# Patient Record
Sex: FEMALE | Race: OTHER | HISPANIC OR LATINO
[De-identification: names, ages, dates, MRNs, and addresses within clinical notes are randomized per-mention and may not be internally consistent; named-entity substitution may affect disease eponyms.]

---

## 2019-10-01 PROBLEM — Z00.00 ENCOUNTER FOR PREVENTIVE HEALTH EXAMINATION: Status: ACTIVE | Noted: 2019-10-01

## 2019-10-22 ENCOUNTER — APPOINTMENT (OUTPATIENT)
Dept: GASTROENTEROLOGY | Facility: CLINIC | Age: 49
End: 2019-10-22

## 2022-04-12 ENCOUNTER — APPOINTMENT (OUTPATIENT)
Dept: GASTROENTEROLOGY | Facility: CLINIC | Age: 52
End: 2022-04-12

## 2024-07-23 ENCOUNTER — EMERGENCY (EMERGENCY)
Facility: HOSPITAL | Age: 54
LOS: 1 days | Discharge: HOME | End: 2024-07-23
Attending: EMERGENCY MEDICINE
Payer: COMMERCIAL

## 2024-07-23 VITALS
TEMPERATURE: 102 F | SYSTOLIC BLOOD PRESSURE: 143 MMHG | RESPIRATION RATE: 22 BRPM | WEIGHT: 193.12 LBS | DIASTOLIC BLOOD PRESSURE: 78 MMHG | HEART RATE: 89 BPM | OXYGEN SATURATION: 99 %

## 2024-07-23 RX ORDER — DEXTROSE MONOHYDRATE AND SODIUM CHLORIDE 5; .3 G/100ML; G/100ML
2700 INJECTION, SOLUTION INTRAVENOUS ONCE
Refills: 0 | Status: COMPLETED | OUTPATIENT
Start: 2024-07-23 | End: 2024-07-23

## 2024-07-23 RX ORDER — FAMOTIDINE 40 MG
20 TABLET ORAL ONCE
Refills: 0 | Status: COMPLETED | OUTPATIENT
Start: 2024-07-23 | End: 2024-07-23

## 2024-07-23 RX ORDER — AMPICILLIN AND SULBACTAM 2; 1 G/20ML; G/20ML
3 INJECTION, POWDER, FOR SOLUTION INTRAMUSCULAR; INTRAVENOUS ONCE
Refills: 0 | Status: COMPLETED | OUTPATIENT
Start: 2024-07-23 | End: 2024-07-23

## 2024-07-23 RX ORDER — ACETAMINOPHEN 325 MG
1000 TABLET ORAL ONCE
Refills: 0 | Status: COMPLETED | OUTPATIENT
Start: 2024-07-23 | End: 2024-07-23

## 2024-07-23 RX ORDER — AMPICILLIN AND SULBACTAM 2; 1 G/20ML; G/20ML
3 INJECTION, POWDER, FOR SOLUTION INTRAMUSCULAR; INTRAVENOUS EVERY 6 HOURS
Refills: 0 | Status: DISCONTINUED | OUTPATIENT
Start: 2024-07-24 | End: 2024-07-31

## 2024-07-23 NOTE — ED PROVIDER NOTE - NSFOLLOWUPINSTRUCTIONS_ED_ALL_ED_FT
diverticulitis  Contorno del cuerpo que muestra el estómago y los intestinos, con un primer plano de los divertículos del intestino grueso.  La diverticulitis ocurre cuando las heces y las bacterias quedan atrapadas en pequeñas bolsas en el colon llamadas divertículos. Estas bolsas pueden formarse si usted tiene morales afección llamada diverticulosis. Cuando las heces y las bacterias quedan atrapadas, pueden provocar morales infección e inflamación.    La diverticulitis puede causar dolor de estómago intenso y diarrea. También puede provocar daño tisular en el colon. [place] puede causar sangrado u obstrucción. En algunos casos, los divertículos pueden estallar (romperse). [place] puede hacer que las heces infectadas lleguen a otras partes del abdomen.    ¿Cuales son las causas?  Esta afección es causada porque las heces quedan atrapadas en los divertículos. [place] permite que las bacterias crezcan. Puede provocar inflamación e infección.    ¿Qué aumenta el riesgo?  Es más probable que padezca esta afección si tiene diverticulosis. También corre mayor riesgo si:  Tiene sobrepeso o es kandi.  No haces suficiente ejercicio.  Tu bebes alcohol.  Tu fumas.  Comes mucha carne reji, randi ternera, cerdo o foley.  No obtienes suficiente fibra. Los alimentos ricos en fibra incluyen frutas, verduras, frijoles, nueces y cereales integrales.  Tienes más de 40 años.  ¿Cuáles son los signos o síntomas?  Los síntomas de esta afección pueden incluir:  Dolor y sensibilidad en el abdomen. Sana dolor a menudo se siente en el lado lex, jose luis puede ocurrir en otros lugares.  Fiebre y escalofríos.  Náuseas y vómitos.  [place].  Hinchazón.  Cambios en la frecuencia con la que defecas.  Gaudencio en tu saroj.  ¿Cómo se diagnostica esto?  Esta condición se diagnostica según herrera historial médico y un examen físico. También es posible que le realicen pruebas para asegurarse de que no haya nada más que esté causando herrera afección. Estas pruebas pueden incluir:  Análisis de gaudencio.  Pruebas realizadas en herrera orina (orina).  Morales tomografía computarizada del abdomen.  Es posible que necesite hacerse morales colonoscopia. Sana es un examen para observar todo el intestino grueso. Micah el examen, se coloca un tubo en la abertura del trasero (ano) y luego se pasa al recto, colon y otras partes del intestino grueso.  Sana examen se realiza para observar los divertículos. También puede jesus si hay algo más que pueda estar causando marina síntomas.    ¿Cómo se trata esto?  La mayoría de los casos son leves y pueden tratarse en casa. Es posible que le indiquen que:  Banks analgésicos de venta joanna.  Sólo coma y rafaela líquidos shaan.  [place] antibióticos.  Descansar.  Es posible que los casos más graves deban tratarse en un hospital. El tratamiento puede incluir:  No comer ni beber.  Nikhil analgésicos.  Recibir antibióticos por vía intravenosa.  Recibir líquidos y nutrición a través de morales vía intravenosa.  Cirugía.  Sigue estas instrucciones en casa:  Medicamentos    Banks los medicamentos recetados y de venta joanna únicamente según las indicaciones de herrera proveedor de atención médica. Estos incluyen suplementos de fibra, probióticos y medicamentos para ablandar las heces (ablandadores de heces).  Si le recetaron antibióticos, tómelos según las indicaciones de herrera proveedor. No deje de usar el antibiótico incluso si comienza a sentirse mejor.  Pregúntele a herrera proveedor si el medicamento que le recetaron requiere que evite conducir o utilizar maquinaria.  Comiendo y bebiendo    Marylou, omega rojos, alcachofa y frijoles.  Siga la dieta indicada por herrera proveedor. Es posible que sólo necesite comer y beber líquidos.  Morales vez que marina síntomas mejoren, es posible que pueda volver a morales dieta más normal. Es posible que le indiquen que coma al menos 25 gramos (25 g) de fibra al día. La fibra hace que sea más fácil defecar. Las byrd saludables de fibra incluyen:  Bayas. Morales taza tiene de 4 a 8 g de fibra.  Frijoles o lentejas. Media taza tiene entre 5 y 8 g de fibra.  Vegetales verdes. Morales taza tiene 4 g de fibra.  Evite comer padma forman.  Instrucciones generales    No utilice ningún producto que contenga nicotina o tabaco. Estos productos incluyen cigarrillos, tabaco de mascar y dispositivos de vapeo, randi los cigarrillos electrónicos. Si necesita ayuda para dejar de fumar, pregúntele a herrera proveedor.  Ly ejercicio micah al menos 30 minutos, 3 veces por semana. Ly ejercicio lo suficientemente berto randi para aumentar herrera ritmo cardíaco y empezar a sudar.  Comuníquese con un proveedor de atención médica si:  Tu dolor empeora.  Tu defecación no vuelve a la normalidad.  Marina síntomas no mejoran con el tratamiento.  Marina síntomas empeoran de repente.  Tienes fiebre.  Vomitas más de morales vez.  Tu saroj tiene gaudencio, es nany o alquitranada.  Esta información no pretende reemplazar los consejos que le brinde herrera proveedor de atención médica. Asegúrese de discutir cualquier pregunta que tenga con herrera proveedor de atención médica.

## 2024-07-23 NOTE — ED ADULT TRIAGE NOTE - CHIEF COMPLAINT QUOTE
pt c/o LLQ abd. pain w/o N/V/D, subjective fever @ home, pt endorses pain started yesterday afternoon, pain is worse after eating, neg. chest pain/SOB/back pain, neg. PMH

## 2024-07-23 NOTE — ED PROVIDER NOTE - PATIENT PORTAL LINK FT
You can access the FollowMyHealth Patient Portal offered by [facility] by registering at the following website: [email or web address]. By joining [facility]’s FollowMyHealth portal, you will also be able to view your health information using other applications (apps) compatible with our system.

## 2024-07-23 NOTE — ED PROVIDER NOTE - OBJECTIVE STATEMENT
Pt is a 53 y/o Female with PMHx of HTN presents to ED with complaints of abdominal pain. Pt states that she 1x day hx of LLQ and suprapubic pain that is non-radiating with associated dysuria. Pt states that she has been constipated for 1 day and defecation has been extremely painful. Pt states that she has been taking ampicillin for unknown reason. Pt denies hematochezia, hematuria, recent infection, n/v, dizziness, chest pain, night sweats/rigors.

## 2024-07-23 NOTE — ED PROVIDER NOTE - CLINICAL SUMMARY MEDICAL DECISION MAKING FREE TEXT BOX
Pt is a 53 y/o Female with PMHx of HTN presents to ED with complaints of abdominal pain. Pt states that she 1x day hx of LLQ and suprapubic pain that is non-radiating with associated dysuria. Pt states that she has been constipated for 1 day and defecation has been extremely painful.    Sepsis workup, UA, urine culture, CTAP, pepcid, ofirmev to assess for underlying infection/cause of abdominal pain. Pt is a 55 y/o Female with PMHx of HTN presents to ED with complaints of abdominal pain. Pt states that she 1x day hx of LLQ and suprapubic pain that is non-radiating with associated dysuria. Pt states that she has been constipated for 1 day and defecation has been extremely painful.    Sepsis workup, UA, urine culture, CTAP, pepcid, ofirmev to assess for underlying infection/cause of abdominal pain.    Labs are benign,     CTAP:   IMPRESSION:    Acute uncomplicated sigmoid diverticulitis.    Pt given prescription for augmentin.    Pt cleared for DC.

## 2024-07-23 NOTE — ED PROVIDER NOTE - ATTENDING CONTRIBUTION TO CARE
Elisabeth PALOMINOXA-[id number]-old female with no known medical problems except  presents with 1 day of left lower quadrant pain and constipation for 2 days with fever noted today.  No nausea no vomiting.  Patient had mild dysuria but no hematuria no recent travel or antibiotic use.    ID  [id number]    Patient is alert well-appearing female, S1-S2 normal regular, bilateral clear breath sounds, abdomen soft tender in the left lower quadrant but no guarding normal bowel sounds noted old  scar, no hernia palpated, neuroexam is alert oriented x 3 no focal deficits, skin warm dry good turgor    Plan to check labs and UA to rule out UTI differential diagnosis includes diverticulitis colitis SBO.  Will control her pain and treat her symptomatically and reassess

## 2024-07-23 NOTE — ED PROVIDER NOTE - PHYSICAL EXAMINATION
Vitals: WNL  Gen: laying comfortably in NAD   Head: NCAT    ENT: EOMI. No exudates  CV: RRR. Audible S1 and S2. No murmurs. 2+ radial and DP pulses   Pulm: Clear to auscultation bilaterally. No wheezes, rales, or rhonchi  Abd: soft, ND, no rebound, no guarding, + LLQ and suprapubic tenderness  Musculoskeletal:  No peripheral edema, no calf ttp  Neurologic: AAOx3, no focal deficits  : no CVA tenderness

## 2024-07-23 NOTE — ED PROVIDER NOTE - NS ED ROS FT
Constitutional: no fevers, chills  HEENT: no visual changes, no sore throat, no rhinorrhea  CV: no cp  Resp: no sob  GI: + abd pain, n/v, diarrhea/constipation  : + dysuria, hematuria  MSK: no joint pains  skin: no rashes  neuro: no HA, no confusion  psych: no SI/HI

## 2024-07-24 VITALS
OXYGEN SATURATION: 98 % | SYSTOLIC BLOOD PRESSURE: 155 MMHG | RESPIRATION RATE: 18 BRPM | TEMPERATURE: 99 F | HEART RATE: 88 BPM | DIASTOLIC BLOOD PRESSURE: 109 MMHG

## 2024-07-24 LAB
ALBUMIN SERPL ELPH-MCNC: 3.8 G/DL — SIGNIFICANT CHANGE UP (ref 3.3–5.2)
ALP SERPL-CCNC: 85 U/L — SIGNIFICANT CHANGE UP (ref 40–120)
ALT FLD-CCNC: 21 U/L — SIGNIFICANT CHANGE UP
ANION GAP SERPL CALC-SCNC: 12 MMOL/L — SIGNIFICANT CHANGE UP (ref 5–17)
APPEARANCE UR: CLEAR — SIGNIFICANT CHANGE UP
APTT BLD: 31.9 SEC — SIGNIFICANT CHANGE UP (ref 24.5–35.6)
AST SERPL-CCNC: 20 U/L — SIGNIFICANT CHANGE UP
BACTERIA # UR AUTO: NEGATIVE /HPF — SIGNIFICANT CHANGE UP
BASOPHILS # BLD AUTO: 0.02 K/UL — SIGNIFICANT CHANGE UP (ref 0–0.2)
BASOPHILS NFR BLD AUTO: 0.2 % — SIGNIFICANT CHANGE UP (ref 0–2)
BILIRUB SERPL-MCNC: 1 MG/DL — SIGNIFICANT CHANGE UP (ref 0.4–2)
BILIRUB UR-MCNC: NEGATIVE — SIGNIFICANT CHANGE UP
BUN SERPL-MCNC: 7.3 MG/DL — LOW (ref 8–20)
CALCIUM SERPL-MCNC: 9.1 MG/DL — SIGNIFICANT CHANGE UP (ref 8.4–10.5)
CAST: 1 /LPF — SIGNIFICANT CHANGE UP (ref 0–4)
CHLORIDE SERPL-SCNC: 100 MMOL/L — SIGNIFICANT CHANGE UP (ref 96–108)
CO2 SERPL-SCNC: 25 MMOL/L — SIGNIFICANT CHANGE UP (ref 22–29)
COLOR SPEC: YELLOW — SIGNIFICANT CHANGE UP
CREAT SERPL-MCNC: 0.58 MG/DL — SIGNIFICANT CHANGE UP (ref 0.5–1.3)
DIFF PNL FLD: NEGATIVE — SIGNIFICANT CHANGE UP
EGFR: 107 ML/MIN/1.73M2 — SIGNIFICANT CHANGE UP
EOSINOPHIL # BLD AUTO: 0.01 K/UL — SIGNIFICANT CHANGE UP (ref 0–0.5)
EOSINOPHIL NFR BLD AUTO: 0.1 % — SIGNIFICANT CHANGE UP (ref 0–6)
GLUCOSE SERPL-MCNC: 107 MG/DL — HIGH (ref 70–99)
GLUCOSE UR QL: NEGATIVE MG/DL — SIGNIFICANT CHANGE UP
HCG SERPL-ACNC: <4 MIU/ML — SIGNIFICANT CHANGE UP
HCT VFR BLD CALC: 36.6 % — SIGNIFICANT CHANGE UP (ref 34.5–45)
HGB BLD-MCNC: 12.1 G/DL — SIGNIFICANT CHANGE UP (ref 11.5–15.5)
IMM GRANULOCYTES NFR BLD AUTO: 0.3 % — SIGNIFICANT CHANGE UP (ref 0–0.9)
INR BLD: 1.15 RATIO — SIGNIFICANT CHANGE UP (ref 0.85–1.18)
KETONES UR-MCNC: 15 MG/DL
LACTATE SERPL-SCNC: 0.6 MMOL/L — SIGNIFICANT CHANGE UP (ref 0.5–2)
LEUKOCYTE ESTERASE UR-ACNC: ABNORMAL
LYMPHOCYTES # BLD AUTO: 1.23 K/UL — SIGNIFICANT CHANGE UP (ref 1–3.3)
LYMPHOCYTES # BLD AUTO: 13.7 % — SIGNIFICANT CHANGE UP (ref 13–44)
MCHC RBC-ENTMCNC: 27.6 PG — SIGNIFICANT CHANGE UP (ref 27–34)
MCHC RBC-ENTMCNC: 33.1 GM/DL — SIGNIFICANT CHANGE UP (ref 32–36)
MCV RBC AUTO: 83.4 FL — SIGNIFICANT CHANGE UP (ref 80–100)
MONOCYTES # BLD AUTO: 0.46 K/UL — SIGNIFICANT CHANGE UP (ref 0–0.9)
MONOCYTES NFR BLD AUTO: 5.1 % — SIGNIFICANT CHANGE UP (ref 2–14)
NEUTROPHILS # BLD AUTO: 7.21 K/UL — SIGNIFICANT CHANGE UP (ref 1.8–7.4)
NEUTROPHILS NFR BLD AUTO: 80.6 % — HIGH (ref 43–77)
NITRITE UR-MCNC: NEGATIVE — SIGNIFICANT CHANGE UP
PH UR: 7 — SIGNIFICANT CHANGE UP (ref 5–8)
PLATELET # BLD AUTO: 278 K/UL — SIGNIFICANT CHANGE UP (ref 150–400)
POTASSIUM SERPL-MCNC: 3.9 MMOL/L — SIGNIFICANT CHANGE UP (ref 3.5–5.3)
POTASSIUM SERPL-SCNC: 3.9 MMOL/L — SIGNIFICANT CHANGE UP (ref 3.5–5.3)
PROT SERPL-MCNC: 7.7 G/DL — SIGNIFICANT CHANGE UP (ref 6.6–8.7)
PROT UR-MCNC: NEGATIVE MG/DL — SIGNIFICANT CHANGE UP
PROTHROM AB SERPL-ACNC: 12.7 SEC — SIGNIFICANT CHANGE UP (ref 9.5–13)
RBC # BLD: 4.39 M/UL — SIGNIFICANT CHANGE UP (ref 3.8–5.2)
RBC # FLD: 14.6 % — HIGH (ref 10.3–14.5)
RBC CASTS # UR COMP ASSIST: 2 /HPF — SIGNIFICANT CHANGE UP (ref 0–4)
SODIUM SERPL-SCNC: 136 MMOL/L — SIGNIFICANT CHANGE UP (ref 135–145)
SP GR SPEC: 1.01 — SIGNIFICANT CHANGE UP (ref 1–1.03)
SQUAMOUS # UR AUTO: 5 /HPF — SIGNIFICANT CHANGE UP (ref 0–5)
UROBILINOGEN FLD QL: 1 MG/DL — SIGNIFICANT CHANGE UP (ref 0.2–1)
WBC # BLD: 8.96 K/UL — SIGNIFICANT CHANGE UP (ref 3.8–10.5)
WBC # FLD AUTO: 8.96 K/UL — SIGNIFICANT CHANGE UP (ref 3.8–10.5)
WBC UR QL: 3 /HPF — SIGNIFICANT CHANGE UP (ref 0–5)

## 2024-07-24 RX ORDER — AMOXICILLIN/POTASSIUM CLAV 250-125 MG
875 TABLET ORAL
Qty: 30 | Refills: 0
Start: 2024-07-24 | End: 2024-08-07

## 2024-07-24 RX ORDER — METRONIDAZOLE 500 MG/1
500 TABLET ORAL ONCE
Refills: 0 | Status: DISCONTINUED | OUTPATIENT
Start: 2024-07-24 | End: 2024-07-24

## 2024-07-24 RX ORDER — CIPROFLOXACIN HCL 500 MG
500 TABLET ORAL EVERY 12 HOURS
Refills: 0 | Status: DISCONTINUED | OUTPATIENT
Start: 2024-07-24 | End: 2024-07-24

## 2024-07-24 RX ADMIN — AMPICILLIN AND SULBACTAM 200 GRAM(S): 2; 1 INJECTION, POWDER, FOR SOLUTION INTRAMUSCULAR; INTRAVENOUS at 00:21

## 2024-07-24 RX ADMIN — Medication 20 MILLIGRAM(S): at 00:21

## 2024-07-24 RX ADMIN — DEXTROSE MONOHYDRATE AND SODIUM CHLORIDE 2700 MILLILITER(S): 5; .3 INJECTION, SOLUTION INTRAVENOUS at 00:20

## 2024-07-24 RX ADMIN — Medication 400 MILLIGRAM(S): at 00:20

## 2024-07-24 NOTE — ED ADULT NURSE NOTE - OBJECTIVE STATEMENT
pt comes into ED A&Ox4, c/o pain and fevers that started yesterday. pain worsens after eating. pt has no other complaints of pain or discomfort at this time.

## 2024-07-27 DIAGNOSIS — I10 ESSENTIAL (PRIMARY) HYPERTENSION: ICD-10-CM

## 2024-07-27 DIAGNOSIS — R10.32 LEFT LOWER QUADRANT PAIN: ICD-10-CM

## 2024-07-27 DIAGNOSIS — K57.32 DIVERTICULITIS OF LARGE INTESTINE WITHOUT PERFORATION OR ABSCESS WITHOUT BLEEDING: ICD-10-CM

## 2024-07-29 LAB
CULTURE RESULTS: SIGNIFICANT CHANGE UP
CULTURE RESULTS: SIGNIFICANT CHANGE UP
SPECIMEN SOURCE: SIGNIFICANT CHANGE UP
SPECIMEN SOURCE: SIGNIFICANT CHANGE UP

## 2024-10-09 ENCOUNTER — APPOINTMENT (OUTPATIENT)
Dept: GASTROENTEROLOGY | Facility: CLINIC | Age: 54
End: 2024-10-09